# Patient Record
Sex: MALE | Race: OTHER | URBAN - METROPOLITAN AREA
[De-identification: names, ages, dates, MRNs, and addresses within clinical notes are randomized per-mention and may not be internally consistent; named-entity substitution may affect disease eponyms.]

---
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
## 2025-05-10 NOTE — ED PEDIATRIC TRIAGE NOTE - CADM TRG TX PRIOR TO ARRIVAL
Pt to ED with c/o left leg pain x1 week. With reddness to anterior distal leg.  
bleeding control
no...